# Patient Record
Sex: FEMALE | Race: WHITE
[De-identification: names, ages, dates, MRNs, and addresses within clinical notes are randomized per-mention and may not be internally consistent; named-entity substitution may affect disease eponyms.]

---

## 2015-08-16 VITALS
DIASTOLIC BLOOD PRESSURE: 77 MMHG | SYSTOLIC BLOOD PRESSURE: 134 MMHG | DIASTOLIC BLOOD PRESSURE: 77 MMHG | SYSTOLIC BLOOD PRESSURE: 134 MMHG | DIASTOLIC BLOOD PRESSURE: 77 MMHG | SYSTOLIC BLOOD PRESSURE: 134 MMHG | DIASTOLIC BLOOD PRESSURE: 77 MMHG | SYSTOLIC BLOOD PRESSURE: 134 MMHG

## 2017-12-13 ENCOUNTER — HOSPITAL ENCOUNTER (OUTPATIENT)
Dept: HOSPITAL 6 - RAD | Age: 62
End: 2017-12-13
Attending: FAMILY MEDICINE
Payer: COMMERCIAL

## 2017-12-13 DIAGNOSIS — R92.8: ICD-10-CM

## 2017-12-13 DIAGNOSIS — Z12.31: Primary | ICD-10-CM

## 2017-12-13 PROCEDURE — G0202 SCR MAMMO BI INCL CAD: HCPCS

## 2018-01-25 ENCOUNTER — HOSPITAL ENCOUNTER (OUTPATIENT)
Dept: HOSPITAL 6 - RAD | Age: 63
End: 2018-01-25
Attending: FAMILY MEDICINE
Payer: COMMERCIAL

## 2018-01-25 DIAGNOSIS — N63.12: Primary | ICD-10-CM

## 2019-04-24 ENCOUNTER — HOSPITAL ENCOUNTER (OUTPATIENT)
Dept: HOSPITAL 6 - LAB | Age: 64
End: 2019-04-24
Payer: COMMERCIAL

## 2019-04-24 ENCOUNTER — HOSPITAL ENCOUNTER (OUTPATIENT)
Dept: HOSPITAL 19 - ZLAB.WCH | Age: 64
End: 2019-04-24

## 2019-04-24 DIAGNOSIS — Z01.89: Primary | ICD-10-CM

## 2019-04-24 DIAGNOSIS — E11.9: ICD-10-CM

## 2019-04-24 DIAGNOSIS — E55.9: Primary | ICD-10-CM

## 2019-04-24 DIAGNOSIS — E78.00: ICD-10-CM

## 2019-04-24 DIAGNOSIS — E03.9: ICD-10-CM

## 2019-04-24 LAB
ALBUMIN SERPL-MCNC: 4.3 G/DL (ref 3.5–5)
ALT SERPL-CCNC: 16 U/L (ref 9–52)
AST SERPL-CCNC: 19 U/L (ref 14–36)
BILIRUB SERPL-MCNC: 0.7 MG/DL (ref 0.2–1.3)
CALCIUM SERPL-MCNC: 9.1 MG/DL (ref 8.4–10.2)
CO2 SERPL-SCNC: 29 MMOL/L (ref 22–30)
CRP SERPL-MCNC: 1.7 MG/DL (ref 0–0.9)
ERYTHROCYTE [DISTWIDTH] IN BLOOD BY AUTOMATED COUNT: 14.6 % (ref 11.5–14.5)
GLUCOSE SERPL-MCNC: 118 MG/DL (ref 65–105)
HCT VFR BLD AUTO: 43.1 % (ref 37–47)
HGB BLD-MCNC: 13.3 G/DL (ref 12.5–16)
MCH RBC QN AUTO: 26 PG (ref 27–31)
MCHC RBC AUTO-ENTMCNC: 31 G/DL (ref 33–37)
MCV RBC AUTO: 85 FL (ref 78–100)
PLATELET # BLD AUTO: 330 K/MM3 (ref 130–400)
PMV BLD AUTO: 11.3 FL (ref 7.4–10.4)
POTASSIUM SERPL-SCNC: 4.3 MMOL/L (ref 3.6–5)
PROT SERPL-MCNC: 7.1 G/DL (ref 6.3–8.2)
RBC # BLD AUTO: 5.09 M/MM3 (ref 4.1–5.3)
SODIUM SERPL-SCNC: 137 MMOL/L (ref 137–145)
TSH SERPL DL<=0.005 MIU/L-ACNC: 2.73 UIU/ML (ref 0.47–4.68)
WBC # BLD AUTO: 10 K/MM3 (ref 4.8–10.8)

## 2019-04-25 LAB — CRP SERPL-MCNC: (no result) MG/L

## 2019-04-26 LAB — LPA SERPL-MCNC: 11 MG/DL (ref ?–30)

## 2019-05-23 ENCOUNTER — HOSPITAL ENCOUNTER (OUTPATIENT)
Dept: HOSPITAL 6 - RAD | Age: 64
End: 2019-05-23
Attending: NURSE PRACTITIONER
Payer: COMMERCIAL

## 2019-05-23 DIAGNOSIS — R11.0: ICD-10-CM

## 2019-05-23 DIAGNOSIS — K80.20: Primary | ICD-10-CM

## 2019-06-27 ENCOUNTER — HOSPITAL ENCOUNTER (OUTPATIENT)
Dept: HOSPITAL 19 - SDCO | Age: 64
End: 2019-06-27
Attending: SURGERY
Payer: COMMERCIAL

## 2019-06-27 VITALS — HEART RATE: 67 BPM | SYSTOLIC BLOOD PRESSURE: 108 MMHG | DIASTOLIC BLOOD PRESSURE: 46 MMHG

## 2019-06-27 VITALS — BODY MASS INDEX: 36 KG/M2 | HEIGHT: 65.98 IN | WEIGHT: 223.99 LBS

## 2019-06-27 VITALS — TEMPERATURE: 98.6 F

## 2019-06-27 VITALS — DIASTOLIC BLOOD PRESSURE: 48 MMHG | SYSTOLIC BLOOD PRESSURE: 126 MMHG | HEART RATE: 72 BPM

## 2019-06-27 VITALS — SYSTOLIC BLOOD PRESSURE: 127 MMHG | DIASTOLIC BLOOD PRESSURE: 53 MMHG | HEART RATE: 67 BPM

## 2019-06-27 VITALS — HEART RATE: 74 BPM | SYSTOLIC BLOOD PRESSURE: 81 MMHG | DIASTOLIC BLOOD PRESSURE: 59 MMHG

## 2019-06-27 VITALS — DIASTOLIC BLOOD PRESSURE: 65 MMHG | HEART RATE: 70 BPM | SYSTOLIC BLOOD PRESSURE: 131 MMHG

## 2019-06-27 VITALS — DIASTOLIC BLOOD PRESSURE: 64 MMHG | TEMPERATURE: 98.9 F | SYSTOLIC BLOOD PRESSURE: 142 MMHG | HEART RATE: 76 BPM

## 2019-06-27 VITALS — HEART RATE: 67 BPM | SYSTOLIC BLOOD PRESSURE: 146 MMHG | DIASTOLIC BLOOD PRESSURE: 64 MMHG

## 2019-06-27 DIAGNOSIS — Z79.51: ICD-10-CM

## 2019-06-27 DIAGNOSIS — E66.9: ICD-10-CM

## 2019-06-27 DIAGNOSIS — Z80.0: ICD-10-CM

## 2019-06-27 DIAGNOSIS — K80.10: Primary | ICD-10-CM

## 2019-06-27 DIAGNOSIS — Z80.8: ICD-10-CM

## 2019-06-27 DIAGNOSIS — J45.909: ICD-10-CM

## 2019-06-27 DIAGNOSIS — Z88.2: ICD-10-CM

## 2019-06-27 DIAGNOSIS — E78.00: ICD-10-CM

## 2019-06-27 DIAGNOSIS — I10: ICD-10-CM

## 2019-06-27 DIAGNOSIS — G47.33: ICD-10-CM

## 2019-06-27 DIAGNOSIS — Z88.8: ICD-10-CM

## 2019-06-27 DIAGNOSIS — Z82.49: ICD-10-CM

## 2019-06-27 DIAGNOSIS — D64.9: ICD-10-CM

## 2019-06-27 DIAGNOSIS — E11.40: ICD-10-CM

## 2019-06-27 DIAGNOSIS — Z83.3: ICD-10-CM

## 2019-06-27 DIAGNOSIS — Z82.3: ICD-10-CM

## 2019-06-27 DIAGNOSIS — Z88.0: ICD-10-CM

## 2019-06-27 DIAGNOSIS — Z88.1: ICD-10-CM

## 2019-06-27 DIAGNOSIS — Z79.4: ICD-10-CM

## 2019-06-27 DIAGNOSIS — E78.5: ICD-10-CM

## 2019-06-27 DIAGNOSIS — Z79.82: ICD-10-CM

## 2019-06-27 NOTE — NUR
TO RM PER CART FROM PACU. DROWSY BUT ANSWERS QUESTIONS COHERENTLY.  C/O
FEELING QUEEZY, NOT DENIES NAUEA. C/O FEELING SORE, BUT DENIES PAIN MED AT
THIS TIME. 02 AT 3L PER NC WITH SATS 93%.
INCISIONS COVERED WITH SWIFE SET, CLEAN DRY AND INTACT.
 AND FATHER AT BEDSIDE.

## 2019-06-27 NOTE — NUR
RECEIVED DISCHARGE INSTRUCTIONS AND VERBALIZED UNDERSTANDING.
DISCONTINUED IV AND INT
 ASSISTING PATIENT DRESSED.

## 2019-06-27 NOTE — NUR
PRIOR TO SURGERY PATIENT RECEIVED ICG 12.5MG IV .
ANESTHESIA INFORMED OF BLOOD SUGAR 83, NO NEW ORDERS.

## 2019-06-27 NOTE — NUR
PATIENT STATED SHE FEELS SO TIRED AND WANTED TO SLEEP A LITTLE LONGER.
CHASE SET OVER SITES CLEAN DRY INTACT
IV L HAND PATENT

## 2019-06-29 ENCOUNTER — HOSPITAL ENCOUNTER (EMERGENCY)
Dept: HOSPITAL 6 - ED | Age: 64
Discharge: HOME | End: 2019-06-29
Payer: COMMERCIAL

## 2019-06-29 VITALS — BODY MASS INDEX: 35.43 KG/M2 | WEIGHT: 220.46 LBS | HEIGHT: 66 IN

## 2019-06-29 VITALS
SYSTOLIC BLOOD PRESSURE: 147 MMHG | SYSTOLIC BLOOD PRESSURE: 147 MMHG | DIASTOLIC BLOOD PRESSURE: 78 MMHG | DIASTOLIC BLOOD PRESSURE: 78 MMHG | DIASTOLIC BLOOD PRESSURE: 78 MMHG | SYSTOLIC BLOOD PRESSURE: 147 MMHG

## 2019-06-29 DIAGNOSIS — Z98.1: ICD-10-CM

## 2019-06-29 DIAGNOSIS — G89.18: Primary | ICD-10-CM

## 2019-06-29 DIAGNOSIS — E11.9: ICD-10-CM

## 2019-06-29 DIAGNOSIS — Z90.49: ICD-10-CM

## 2019-06-29 DIAGNOSIS — F41.9: ICD-10-CM

## 2019-06-29 DIAGNOSIS — R10.9: ICD-10-CM

## 2019-06-29 LAB
ALBUMIN SERPL-MCNC: 3.9 G/DL (ref 3.4–4.8)
ALT SERPL-CCNC: 38 U/L (ref 0–55)
APPEARANCE UR: CLEAR
AST SERPL-CCNC: 33 U/L (ref 5–34)
BASOPHILS # BLD: 0 10*3/UL (ref 0.02–0.1)
BILIRUB SERPL-MCNC: 0.4 MG/DL (ref 0.2–1.2)
BILIRUB UR QL STRIP.AUTO: NEGATIVE
CALCIUM SERPL-MCNC: 9.1 MG/DL (ref 8.3–10.5)
CO2 SERPL-SCNC: 22 MMOL/L (ref 23–31)
COLOR UR AUTO: YELLOW
EOSINOPHIL # BLD: 0.3 10*3/UL (ref 0.04–0.4)
EOSINOPHIL NFR BLD: 2.4 % (ref 1–5)
ERYTHROCYTE [DISTWIDTH] IN BLOOD BY AUTOMATED COUNT: 14.4 % (ref 11.5–14.5)
GLUCOSE SERPL-MCNC: 63 MG/DL (ref 65–105)
GLUCOSE UR QL STRIP.AUTO: NEGATIVE MG/DL
HCT VFR BLD AUTO: 41.5 % (ref 37–47)
HGB BLD-MCNC: 13 G/DL (ref 12.5–16)
KETONES UR QL STRIP.AUTO: NEGATIVE
LEUKOCYTE ESTERASE UR QL STRIP: NEGATIVE
LYMPHOCYTES # BLD: 2.5 10*3/UL (ref 1.5–4)
MCH RBC QN AUTO: 26 PG (ref 27–31)
MCHC RBC AUTO-ENTMCNC: 31 G/DL (ref 33–37)
MCV RBC AUTO: 84 FL (ref 78–100)
MONOCYTES # BLD: 1.1 10*3/UL (ref 0.2–0.8)
NEUTROPHILS # BLD: 8.9 10*3/UL (ref 1.4–6.5)
NITRITE UR QL STRIP: NEGATIVE
PH UR STRIP.AUTO: 5 [PH] (ref 5–8)
PLATELET # BLD AUTO: 327 K/MM3 (ref 130–400)
PMV BLD AUTO: 11.4 FL (ref 7.4–10.4)
POTASSIUM SERPL-SCNC: 4.1 MMOL/L (ref 3.5–5.1)
PROT ?TM UR-MCNC: NEGATIVE MG/DL
PROT SERPL-MCNC: 7.1 G/DL (ref 6.2–8.1)
RBC # BLD AUTO: 4.93 M/MM3 (ref 4.1–5.3)
RBC UR QL AUTO: NEGATIVE
SODIUM SERPL-SCNC: 139 MMOL/L (ref 136–145)
SP GR UR STRIP.AUTO: 1.02 (ref 1–1.03)
UROBILINOGEN UR-MCNC: NORMAL MG/DL
WBC # BLD AUTO: 12.8 K/MM3 (ref 4.8–10.8)
WBC #/AREA URNS HPF: (no result) /HPF (ref 0–3)

## 2019-10-29 ENCOUNTER — HOSPITAL ENCOUNTER (OUTPATIENT)
Dept: HOSPITAL 6 - MAMMO | Age: 64
End: 2019-10-29
Attending: FAMILY MEDICINE
Payer: COMMERCIAL

## 2019-10-29 DIAGNOSIS — Z12.31: Primary | ICD-10-CM

## 2021-06-08 ENCOUNTER — HOSPITAL ENCOUNTER (OUTPATIENT)
Dept: HOSPITAL 6 - RAD | Age: 66
End: 2021-06-08
Attending: FAMILY MEDICINE
Payer: COMMERCIAL

## 2021-06-08 DIAGNOSIS — M79.605: Primary | ICD-10-CM

## 2021-08-13 ENCOUNTER — HOSPITAL ENCOUNTER (OUTPATIENT)
Dept: HOSPITAL 6 - RAD | Age: 66
End: 2021-08-13
Payer: COMMERCIAL

## 2021-08-13 DIAGNOSIS — E05.00: Primary | ICD-10-CM

## 2021-11-03 ENCOUNTER — HOSPITAL ENCOUNTER (OUTPATIENT)
Dept: HOSPITAL 6 - LAB | Age: 66
End: 2021-11-03
Attending: PHYSICIAN ASSISTANT
Payer: COMMERCIAL

## 2021-11-03 DIAGNOSIS — L30.9: Primary | ICD-10-CM

## 2022-02-16 ENCOUNTER — HOSPITAL ENCOUNTER (OUTPATIENT)
Dept: HOSPITAL 6 - RAD | Age: 67
End: 2022-02-16
Payer: COMMERCIAL

## 2022-02-16 DIAGNOSIS — M43.16: Primary | ICD-10-CM

## 2022-02-16 DIAGNOSIS — Z98.1: ICD-10-CM

## 2022-03-23 ENCOUNTER — HOSPITAL ENCOUNTER (OUTPATIENT)
Dept: HOSPITAL 6 - PT | Age: 67
LOS: 8 days | Discharge: STILL ADMITTED OR ADMINISTRATIVE | End: 2022-03-31
Payer: COMMERCIAL

## 2022-03-23 DIAGNOSIS — M51.37: Primary | ICD-10-CM

## 2022-04-05 ENCOUNTER — HOSPITAL ENCOUNTER (OUTPATIENT)
Dept: HOSPITAL 6 - PT | Age: 67
LOS: 25 days | Discharge: STILL ADMITTED OR ADMINISTRATIVE | End: 2022-04-30
Payer: COMMERCIAL

## 2022-04-05 DIAGNOSIS — M51.37: Primary | ICD-10-CM

## 2022-07-27 ENCOUNTER — HOSPITAL ENCOUNTER (OUTPATIENT)
Dept: HOSPITAL 6 - PT | Age: 67
LOS: 4 days | Discharge: HOME | End: 2022-07-31
Payer: COMMERCIAL

## 2022-07-27 DIAGNOSIS — M70.62: Primary | ICD-10-CM

## 2022-07-27 DIAGNOSIS — M54.16: ICD-10-CM

## 2022-08-02 ENCOUNTER — HOSPITAL ENCOUNTER (OUTPATIENT)
Dept: HOSPITAL 6 - PT | Age: 67
LOS: 29 days | Discharge: HOME | End: 2022-08-31
Payer: COMMERCIAL

## 2022-08-02 DIAGNOSIS — M70.62: Primary | ICD-10-CM

## 2022-08-02 DIAGNOSIS — M54.16: ICD-10-CM

## 2022-08-15 ENCOUNTER — HOSPITAL ENCOUNTER (OUTPATIENT)
Dept: HOSPITAL 6 - MAMMO | Age: 67
End: 2022-08-15
Attending: FAMILY MEDICINE
Payer: COMMERCIAL

## 2022-08-15 DIAGNOSIS — Z12.31: Primary | ICD-10-CM

## 2022-08-31 ENCOUNTER — HOSPITAL ENCOUNTER (OUTPATIENT)
Dept: HOSPITAL 6 - OT | Age: 67
End: 2022-08-31
Payer: COMMERCIAL

## 2022-08-31 DIAGNOSIS — M79.641: Primary | ICD-10-CM

## 2022-08-31 DIAGNOSIS — M79.642: ICD-10-CM

## 2022-09-26 ENCOUNTER — HOSPITAL ENCOUNTER (OUTPATIENT)
Dept: HOSPITAL 6 - RAD | Age: 67
End: 2022-09-26
Attending: PHYSICIAN ASSISTANT
Payer: COMMERCIAL

## 2022-09-26 DIAGNOSIS — R06.00: Primary | ICD-10-CM

## 2023-01-24 ENCOUNTER — HOSPITAL ENCOUNTER (OUTPATIENT)
Dept: HOSPITAL 6 - RAD | Age: 68
End: 2023-01-24
Payer: COMMERCIAL

## 2023-01-24 DIAGNOSIS — I50.20: Primary | ICD-10-CM

## 2023-09-08 ENCOUNTER — HOSPITAL ENCOUNTER (OUTPATIENT)
Dept: HOSPITAL 6 - RAD | Age: 68
End: 2023-09-08
Attending: NURSE PRACTITIONER
Payer: MEDICARE

## 2023-09-08 DIAGNOSIS — R23.8: ICD-10-CM

## 2023-09-08 DIAGNOSIS — M79.662: Primary | ICD-10-CM

## 2023-09-08 DIAGNOSIS — M79.89: ICD-10-CM

## 2024-09-11 ENCOUNTER — HOSPITAL ENCOUNTER (OUTPATIENT)
Dept: HOSPITAL 6 - RAD | Age: 69
End: 2024-09-11
Attending: NURSE PRACTITIONER
Payer: MEDICARE

## 2024-09-11 DIAGNOSIS — M79.89: Primary | ICD-10-CM

## 2024-09-24 ENCOUNTER — HOSPITAL ENCOUNTER (OUTPATIENT)
Dept: HOSPITAL 6 - RAD | Age: 69
End: 2024-09-24
Attending: NURSE PRACTITIONER
Payer: MEDICARE

## 2024-09-24 DIAGNOSIS — M79.89: Primary | ICD-10-CM

## 2024-09-24 DIAGNOSIS — M25.551: ICD-10-CM

## 2024-09-24 PROCEDURE — A9575 INJ GADOTERATE MEGLUMI 0.1ML: HCPCS
